# Patient Record
Sex: FEMALE | Race: WHITE | NOT HISPANIC OR LATINO | Employment: FULL TIME | URBAN - METROPOLITAN AREA
[De-identification: names, ages, dates, MRNs, and addresses within clinical notes are randomized per-mention and may not be internally consistent; named-entity substitution may affect disease eponyms.]

---

## 2018-07-27 ENCOUNTER — HOSPITAL ENCOUNTER (OUTPATIENT)
Facility: MEDICAL CENTER | Age: 48
End: 2018-07-29
Attending: EMERGENCY MEDICINE | Admitting: FAMILY MEDICINE

## 2018-07-27 ENCOUNTER — APPOINTMENT (OUTPATIENT)
Dept: RADIOLOGY | Facility: MEDICAL CENTER | Age: 48
End: 2018-07-27
Attending: EMERGENCY MEDICINE

## 2018-07-27 DIAGNOSIS — K04.7 APICAL ABSCESS: ICD-10-CM

## 2018-07-27 DIAGNOSIS — K08.89 PAIN, DENTAL: ICD-10-CM

## 2018-07-27 LAB
ALBUMIN SERPL BCP-MCNC: 3.7 G/DL (ref 3.2–4.9)
ALBUMIN/GLOB SERPL: 1 G/DL
ALP SERPL-CCNC: 60 U/L (ref 30–99)
ALT SERPL-CCNC: 46 U/L (ref 2–50)
ANION GAP SERPL CALC-SCNC: 7 MMOL/L (ref 0–11.9)
APPEARANCE UR: CLEAR
AST SERPL-CCNC: 25 U/L (ref 12–45)
BASOPHILS # BLD AUTO: 0.5 % (ref 0–1.8)
BASOPHILS # BLD: 0.05 K/UL (ref 0–0.12)
BILIRUB SERPL-MCNC: 0.7 MG/DL (ref 0.1–1.5)
BILIRUB UR QL STRIP.AUTO: NEGATIVE
BUN SERPL-MCNC: 9 MG/DL (ref 8–22)
CALCIUM SERPL-MCNC: 9.1 MG/DL (ref 8.4–10.2)
CHLORIDE SERPL-SCNC: 104 MMOL/L (ref 96–112)
CO2 SERPL-SCNC: 20 MMOL/L (ref 20–33)
COLOR UR: YELLOW
CREAT SERPL-MCNC: 0.8 MG/DL (ref 0.5–1.4)
EOSINOPHIL # BLD AUTO: 0.15 K/UL (ref 0–0.51)
EOSINOPHIL NFR BLD: 1.5 % (ref 0–6.9)
ERYTHROCYTE [DISTWIDTH] IN BLOOD BY AUTOMATED COUNT: 41.6 FL (ref 35.9–50)
GLOBULIN SER CALC-MCNC: 3.7 G/DL (ref 1.9–3.5)
GLUCOSE SERPL-MCNC: 120 MG/DL (ref 65–99)
GLUCOSE UR STRIP.AUTO-MCNC: NEGATIVE MG/DL
HCT VFR BLD AUTO: 39.7 % (ref 37–47)
HGB BLD-MCNC: 13 G/DL (ref 12–16)
IMM GRANULOCYTES # BLD AUTO: 0.04 K/UL (ref 0–0.11)
IMM GRANULOCYTES NFR BLD AUTO: 0.4 % (ref 0–0.9)
KETONES UR STRIP.AUTO-MCNC: NEGATIVE MG/DL
LACTATE BLD-SCNC: 0.9 MMOL/L (ref 0.5–2)
LEUKOCYTE ESTERASE UR QL STRIP.AUTO: NEGATIVE
LYMPHOCYTES # BLD AUTO: 1.73 K/UL (ref 1–4.8)
LYMPHOCYTES NFR BLD: 17.4 % (ref 22–41)
MCH RBC QN AUTO: 27.5 PG (ref 27–33)
MCHC RBC AUTO-ENTMCNC: 32.7 G/DL (ref 33.6–35)
MCV RBC AUTO: 84.1 FL (ref 81.4–97.8)
MICRO URNS: NORMAL
MONOCYTES # BLD AUTO: 0.94 K/UL (ref 0–0.85)
MONOCYTES NFR BLD AUTO: 9.5 % (ref 0–13.4)
NEUTROPHILS # BLD AUTO: 7.01 K/UL (ref 2–7.15)
NEUTROPHILS NFR BLD: 70.7 % (ref 44–72)
NITRITE UR QL STRIP.AUTO: NEGATIVE
NRBC # BLD AUTO: 0 K/UL
NRBC BLD-RTO: 0 /100 WBC
PH UR STRIP.AUTO: 6.5 [PH]
PLATELET # BLD AUTO: 222 K/UL (ref 164–446)
PMV BLD AUTO: 9 FL (ref 9–12.9)
POTASSIUM SERPL-SCNC: 3.8 MMOL/L (ref 3.6–5.5)
PROT SERPL-MCNC: 7.4 G/DL (ref 6–8.2)
PROT UR QL STRIP: NEGATIVE MG/DL
RBC # BLD AUTO: 4.72 M/UL (ref 4.2–5.4)
RBC UR QL AUTO: NEGATIVE
SODIUM SERPL-SCNC: 131 MMOL/L (ref 135–145)
SP GR UR STRIP.AUTO: <=1.005
WBC # BLD AUTO: 9.9 K/UL (ref 4.8–10.8)

## 2018-07-27 PROCEDURE — A9270 NON-COVERED ITEM OR SERVICE: HCPCS

## 2018-07-27 PROCEDURE — 83605 ASSAY OF LACTIC ACID: CPT

## 2018-07-27 PROCEDURE — 87040 BLOOD CULTURE FOR BACTERIA: CPT

## 2018-07-27 PROCEDURE — 87086 URINE CULTURE/COLONY COUNT: CPT

## 2018-07-27 PROCEDURE — 96365 THER/PROPH/DIAG IV INF INIT: CPT

## 2018-07-27 PROCEDURE — 700111 HCHG RX REV CODE 636 W/ 250 OVERRIDE (IP): Performed by: EMERGENCY MEDICINE

## 2018-07-27 PROCEDURE — 99285 EMERGENCY DEPT VISIT HI MDM: CPT

## 2018-07-27 PROCEDURE — 80053 COMPREHEN METABOLIC PANEL: CPT

## 2018-07-27 PROCEDURE — 700102 HCHG RX REV CODE 250 W/ 637 OVERRIDE(OP)

## 2018-07-27 PROCEDURE — 700117 HCHG RX CONTRAST REV CODE 255: Performed by: EMERGENCY MEDICINE

## 2018-07-27 PROCEDURE — 81003 URINALYSIS AUTO W/O SCOPE: CPT

## 2018-07-27 PROCEDURE — 36415 COLL VENOUS BLD VENIPUNCTURE: CPT

## 2018-07-27 PROCEDURE — 85025 COMPLETE CBC W/AUTO DIFF WBC: CPT

## 2018-07-27 PROCEDURE — 96375 TX/PRO/DX INJ NEW DRUG ADDON: CPT

## 2018-07-27 PROCEDURE — 700105 HCHG RX REV CODE 258: Performed by: EMERGENCY MEDICINE

## 2018-07-27 PROCEDURE — 71045 X-RAY EXAM CHEST 1 VIEW: CPT

## 2018-07-27 PROCEDURE — 70487 CT MAXILLOFACIAL W/DYE: CPT

## 2018-07-27 RX ORDER — MORPHINE SULFATE 4 MG/ML
4 INJECTION, SOLUTION INTRAMUSCULAR; INTRAVENOUS ONCE
Status: COMPLETED | OUTPATIENT
Start: 2018-07-28 | End: 2018-07-28

## 2018-07-27 RX ORDER — ACETAMINOPHEN 325 MG/1
650 TABLET ORAL ONCE
Status: COMPLETED | OUTPATIENT
Start: 2018-07-27 | End: 2018-07-27

## 2018-07-27 RX ORDER — KETOROLAC TROMETHAMINE 30 MG/ML
30 INJECTION, SOLUTION INTRAMUSCULAR; INTRAVENOUS ONCE
Status: COMPLETED | OUTPATIENT
Start: 2018-07-27 | End: 2018-07-27

## 2018-07-27 RX ADMIN — ACETAMINOPHEN 650 MG: 325 TABLET, FILM COATED ORAL at 21:28

## 2018-07-27 RX ADMIN — KETOROLAC TROMETHAMINE 30 MG: 30 INJECTION, SOLUTION INTRAMUSCULAR at 22:21

## 2018-07-27 RX ADMIN — SODIUM CHLORIDE 3 G: 900 INJECTION INTRAVENOUS at 22:21

## 2018-07-27 RX ADMIN — IOHEXOL 80 ML: 350 INJECTION, SOLUTION INTRAVENOUS at 22:40

## 2018-07-27 ASSESSMENT — ENCOUNTER SYMPTOMS
NAUSEA: 0
SORE THROAT: 1
ABDOMINAL PAIN: 0
DIZZINESS: 0
FEVER: 1
VOMITING: 0

## 2018-07-27 ASSESSMENT — PAIN SCALES - GENERAL: PAINLEVEL_OUTOF10: 0

## 2018-07-28 ENCOUNTER — APPOINTMENT (OUTPATIENT)
Dept: RADIOLOGY | Facility: MEDICAL CENTER | Age: 48
End: 2018-07-28
Attending: EMERGENCY MEDICINE

## 2018-07-28 PROBLEM — L03.211 FACIAL CELLULITIS: Status: ACTIVE | Noted: 2018-07-28

## 2018-07-28 PROBLEM — K04.7 APICAL ABSCESS: Status: ACTIVE | Noted: 2018-07-28

## 2018-07-28 PROBLEM — K08.89 PAIN, DENTAL: Status: ACTIVE | Noted: 2018-07-28

## 2018-07-28 PROBLEM — E87.1 HYPONATREMIA: Status: ACTIVE | Noted: 2018-07-28

## 2018-07-28 PROCEDURE — 160035 HCHG PACU - 1ST 60 MINS PHASE I: Performed by: ORAL & MAXILLOFACIAL SURGERY

## 2018-07-28 PROCEDURE — 70355 PANORAMIC X-RAY OF JAWS: CPT

## 2018-07-28 PROCEDURE — 700111 HCHG RX REV CODE 636 W/ 250 OVERRIDE (IP)

## 2018-07-28 PROCEDURE — G0378 HOSPITAL OBSERVATION PER HR: HCPCS

## 2018-07-28 PROCEDURE — 96376 TX/PRO/DX INJ SAME DRUG ADON: CPT

## 2018-07-28 PROCEDURE — 700102 HCHG RX REV CODE 250 W/ 637 OVERRIDE(OP)

## 2018-07-28 PROCEDURE — 500380 HCHG DRAIN, PENROSE 1/4X12: Performed by: ORAL & MAXILLOFACIAL SURGERY

## 2018-07-28 PROCEDURE — 94760 N-INVAS EAR/PLS OXIMETRY 1: CPT

## 2018-07-28 PROCEDURE — A9270 NON-COVERED ITEM OR SERVICE: HCPCS | Performed by: FAMILY MEDICINE

## 2018-07-28 PROCEDURE — 160048 HCHG OR STATISTICAL LEVEL 1-5: Performed by: ORAL & MAXILLOFACIAL SURGERY

## 2018-07-28 PROCEDURE — 160002 HCHG RECOVERY MINUTES (STAT): Performed by: ORAL & MAXILLOFACIAL SURGERY

## 2018-07-28 PROCEDURE — 160028 HCHG SURGERY MINUTES - 1ST 30 MINS LEVEL 3: Performed by: ORAL & MAXILLOFACIAL SURGERY

## 2018-07-28 PROCEDURE — 96375 TX/PRO/DX INJ NEW DRUG ADDON: CPT

## 2018-07-28 PROCEDURE — 700105 HCHG RX REV CODE 258: Performed by: FAMILY MEDICINE

## 2018-07-28 PROCEDURE — 700111 HCHG RX REV CODE 636 W/ 250 OVERRIDE (IP): Performed by: EMERGENCY MEDICINE

## 2018-07-28 PROCEDURE — 700105 HCHG RX REV CODE 258

## 2018-07-28 PROCEDURE — 700105 HCHG RX REV CODE 258: Performed by: HOSPITALIST

## 2018-07-28 PROCEDURE — 160009 HCHG ANES TIME/MIN: Performed by: ORAL & MAXILLOFACIAL SURGERY

## 2018-07-28 PROCEDURE — 700111 HCHG RX REV CODE 636 W/ 250 OVERRIDE (IP): Performed by: ORAL & MAXILLOFACIAL SURGERY

## 2018-07-28 PROCEDURE — 700101 HCHG RX REV CODE 250

## 2018-07-28 PROCEDURE — 700111 HCHG RX REV CODE 636 W/ 250 OVERRIDE (IP): Performed by: FAMILY MEDICINE

## 2018-07-28 PROCEDURE — 160039 HCHG SURGERY MINUTES - EA ADDL 1 MIN LEVEL 3: Performed by: ORAL & MAXILLOFACIAL SURGERY

## 2018-07-28 PROCEDURE — 501838 HCHG SUTURE GENERAL: Performed by: ORAL & MAXILLOFACIAL SURGERY

## 2018-07-28 PROCEDURE — 700102 HCHG RX REV CODE 250 W/ 637 OVERRIDE(OP): Performed by: FAMILY MEDICINE

## 2018-07-28 PROCEDURE — 99219 PR INITIAL OBSERVATION CARE,LEVL II: CPT | Performed by: FAMILY MEDICINE

## 2018-07-28 PROCEDURE — A9270 NON-COVERED ITEM OR SERVICE: HCPCS

## 2018-07-28 PROCEDURE — 700111 HCHG RX REV CODE 636 W/ 250 OVERRIDE (IP): Performed by: HOSPITALIST

## 2018-07-28 RX ORDER — LIDOCAINE HYDROCHLORIDE AND EPINEPHRINE 10; 10 MG/ML; UG/ML
INJECTION, SOLUTION INFILTRATION; PERINEURAL
Status: DISCONTINUED | OUTPATIENT
Start: 2018-07-28 | End: 2018-07-28 | Stop reason: HOSPADM

## 2018-07-28 RX ORDER — DEXTROSE AND SODIUM CHLORIDE 5; .9 G/100ML; G/100ML
INJECTION, SOLUTION INTRAVENOUS CONTINUOUS
Status: DISCONTINUED | OUTPATIENT
Start: 2018-07-28 | End: 2018-07-28

## 2018-07-28 RX ORDER — LABETALOL HYDROCHLORIDE 5 MG/ML
10 INJECTION, SOLUTION INTRAVENOUS EVERY 4 HOURS PRN
Status: DISCONTINUED | OUTPATIENT
Start: 2018-07-28 | End: 2018-07-29 | Stop reason: HOSPADM

## 2018-07-28 RX ORDER — SODIUM CHLORIDE 9 MG/ML
INJECTION, SOLUTION INTRAVENOUS CONTINUOUS
Status: DISCONTINUED | OUTPATIENT
Start: 2018-07-28 | End: 2018-07-29 | Stop reason: HOSPADM

## 2018-07-28 RX ORDER — KETOROLAC TROMETHAMINE 30 MG/ML
30 INJECTION, SOLUTION INTRAMUSCULAR; INTRAVENOUS EVERY 6 HOURS
Status: DISCONTINUED | OUTPATIENT
Start: 2018-07-28 | End: 2018-07-29 | Stop reason: HOSPADM

## 2018-07-28 RX ORDER — ONDANSETRON 2 MG/ML
4 INJECTION INTRAMUSCULAR; INTRAVENOUS EVERY 4 HOURS PRN
Status: DISCONTINUED | OUTPATIENT
Start: 2018-07-28 | End: 2018-07-29 | Stop reason: HOSPADM

## 2018-07-28 RX ORDER — HALOPERIDOL 5 MG/ML
1 INJECTION INTRAMUSCULAR EVERY 6 HOURS PRN
Status: DISCONTINUED | OUTPATIENT
Start: 2018-07-28 | End: 2018-07-29 | Stop reason: HOSPADM

## 2018-07-28 RX ORDER — KETOROLAC TROMETHAMINE 30 MG/ML
15 INJECTION, SOLUTION INTRAMUSCULAR; INTRAVENOUS EVERY 6 HOURS PRN
Status: DISCONTINUED | OUTPATIENT
Start: 2018-07-28 | End: 2018-07-28

## 2018-07-28 RX ORDER — OXYCODONE HCL 5 MG/5 ML
SOLUTION, ORAL ORAL
Status: COMPLETED
Start: 2018-07-28 | End: 2018-07-28

## 2018-07-28 RX ORDER — MORPHINE SULFATE 4 MG/ML
4 INJECTION, SOLUTION INTRAMUSCULAR; INTRAVENOUS EVERY 4 HOURS PRN
Status: DISCONTINUED | OUTPATIENT
Start: 2018-07-28 | End: 2018-07-29 | Stop reason: HOSPADM

## 2018-07-28 RX ORDER — KETOROLAC TROMETHAMINE 30 MG/ML
INJECTION, SOLUTION INTRAMUSCULAR; INTRAVENOUS
Status: COMPLETED
Start: 2018-07-28 | End: 2018-07-28

## 2018-07-28 RX ORDER — ACETAMINOPHEN 325 MG/1
650 TABLET ORAL EVERY 6 HOURS PRN
Status: DISCONTINUED | OUTPATIENT
Start: 2018-07-28 | End: 2018-07-29 | Stop reason: HOSPADM

## 2018-07-28 RX ADMIN — MORPHINE SULFATE 4 MG: 4 INJECTION INTRAVENOUS at 23:37

## 2018-07-28 RX ADMIN — ACETAMINOPHEN 650 MG: 325 TABLET, FILM COATED ORAL at 20:14

## 2018-07-28 RX ADMIN — MORPHINE SULFATE 4 MG: 4 INJECTION INTRAVENOUS at 00:05

## 2018-07-28 RX ADMIN — ONDANSETRON HYDROCHLORIDE 4 MG: 2 INJECTION, SOLUTION INTRAMUSCULAR; INTRAVENOUS at 23:40

## 2018-07-28 RX ADMIN — AMPICILLIN SODIUM AND SULBACTAM SODIUM 3 G: 2; 1 INJECTION, POWDER, FOR SOLUTION INTRAMUSCULAR; INTRAVENOUS at 08:03

## 2018-07-28 RX ADMIN — SODIUM CHLORIDE: 9 INJECTION, SOLUTION INTRAVENOUS at 13:25

## 2018-07-28 RX ADMIN — OXYCODONE HYDROCHLORIDE 10 MG: 5 SOLUTION ORAL at 17:50

## 2018-07-28 RX ADMIN — FENTANYL CITRATE 25 MCG: 50 INJECTION, SOLUTION INTRAMUSCULAR; INTRAVENOUS at 17:55

## 2018-07-28 RX ADMIN — FENTANYL CITRATE 25 MCG: 50 INJECTION, SOLUTION INTRAMUSCULAR; INTRAVENOUS at 17:50

## 2018-07-28 RX ADMIN — KETOROLAC TROMETHAMINE 30 MG: 30 INJECTION, SOLUTION INTRAMUSCULAR at 18:26

## 2018-07-28 RX ADMIN — AMPICILLIN SODIUM AND SULBACTAM SODIUM 3 G: 2; 1 INJECTION, POWDER, FOR SOLUTION INTRAMUSCULAR; INTRAVENOUS at 13:25

## 2018-07-28 RX ADMIN — MORPHINE SULFATE 4 MG: 4 INJECTION INTRAVENOUS at 13:20

## 2018-07-28 RX ADMIN — SODIUM CHLORIDE: 900 INJECTION INTRAVENOUS at 03:04

## 2018-07-28 RX ADMIN — KETOROLAC TROMETHAMINE 30 MG: 30 INJECTION, SOLUTION INTRAMUSCULAR at 23:37

## 2018-07-28 RX ADMIN — AMPICILLIN SODIUM AND SULBACTAM SODIUM 3 G: 2; 1 INJECTION, POWDER, FOR SOLUTION INTRAMUSCULAR; INTRAVENOUS at 20:15

## 2018-07-28 RX ADMIN — KETOROLAC TROMETHAMINE 15 MG: 30 INJECTION, SOLUTION INTRAMUSCULAR at 08:02

## 2018-07-28 RX ADMIN — KETOROLAC TROMETHAMINE 15 MG: 30 INJECTION, SOLUTION INTRAMUSCULAR at 02:03

## 2018-07-28 RX ADMIN — ACETAMINOPHEN 650 MG: 325 TABLET, FILM COATED ORAL at 10:05

## 2018-07-28 RX ADMIN — DEXTROSE AND SODIUM CHLORIDE: 5; 900 INJECTION, SOLUTION INTRAVENOUS at 02:53

## 2018-07-28 RX ADMIN — ACETAMINOPHEN 650 MG: 325 TABLET, FILM COATED ORAL at 03:04

## 2018-07-28 ASSESSMENT — PAIN SCALES - GENERAL
PAINLEVEL_OUTOF10: 8
PAINLEVEL_OUTOF10: 6
PAINLEVEL_OUTOF10: 7
PAINLEVEL_OUTOF10: 6
PAINLEVEL_OUTOF10: 3
PAINLEVEL_OUTOF10: 8

## 2018-07-28 ASSESSMENT — LIFESTYLE VARIABLES
ALCOHOL_USE: NO
EVER_SMOKED: YES
EVER_SMOKED: YES

## 2018-07-28 ASSESSMENT — PATIENT HEALTH QUESTIONNAIRE - PHQ9
2. FEELING DOWN, DEPRESSED, IRRITABLE, OR HOPELESS: NOT AT ALL
1. LITTLE INTEREST OR PLEASURE IN DOING THINGS: NOT AT ALL
SUM OF ALL RESPONSES TO PHQ9 QUESTIONS 1 AND 2: 0

## 2018-07-28 ASSESSMENT — ENCOUNTER SYMPTOMS
GASTROINTESTINAL NEGATIVE: 1
FEVER: 1
SINUS PAIN: 1
EYES NEGATIVE: 1
CARDIOVASCULAR NEGATIVE: 1
NEUROLOGICAL NEGATIVE: 1
RESPIRATORY NEGATIVE: 1
MUSCULOSKELETAL NEGATIVE: 1
PSYCHIATRIC NEGATIVE: 1

## 2018-07-28 ASSESSMENT — COPD QUESTIONNAIRES
COPD SCREENING SCORE: 3
DURING THE PAST 4 WEEKS HOW MUCH DID YOU FEEL SHORT OF BREATH: NONE/LITTLE OF THE TIME
HAVE YOU SMOKED AT LEAST 100 CIGARETTES IN YOUR ENTIRE LIFE: YES
DO YOU EVER COUGH UP ANY MUCUS OR PHLEGM?: NO/ONLY WITH OCCASIONAL COLDS OR INFECTIONS

## 2018-07-28 NOTE — ED NOTES
Pt has been medicated for fever, as prescribed per protocol and adhering to strict ED standards.

## 2018-07-28 NOTE — PROGRESS NOTES
Pt seen and examined by myself, admitted early am, see H and P for full details.  Getting oral surgery later today by Dr. Day, on IV unasyn. Added morphine IV for pain.

## 2018-07-28 NOTE — ED PROVIDER NOTES
ED Provider Note    CHIEF COMPLAINT  Chief Complaint   Patient presents with   • Facial Swelling       HPI  Donita Mcguire is a 48 y.o. female who presents with 4 days of dental pain. Patient reports that for the last 2 days due to the pain is worsened and has been difficult to open her mouth. She reports associated fever for one day. She denies any difficulty swallowing or breathing. She denies any changes in voice. She denies any cough dysuria sore throat or abdominal pain.    REVIEW OF SYSTEMS  Review of Systems   Constitutional: Positive for fever.   HENT: Positive for sore throat.    Cardiovascular: Negative for chest pain.   Gastrointestinal: Negative for abdominal pain, nausea and vomiting.   Genitourinary: Negative for dysuria and urgency.   Neurological: Negative for dizziness.       See HPI for further details. All other systems are negative.     PAST MEDICAL HISTORY   has a past medical history of ASTHMA.    SOCIAL HISTORY  Social History     Social History Main Topics   • Smoking status: Current Every Day Smoker     Packs/day: 0.50   • Smokeless tobacco: Never Used   • Alcohol use Yes      Comment: Occasionally   • Drug use: No   • Sexual activity: Not on file       SURGICAL HISTORY   has a past surgical history that includes other orthopedic surgery (2006) and shreyas by laparoscopy (8/12/08).    CURRENT MEDICATIONS  Home Medications     Reviewed by Oliver Gonzalez R.N. (Registered Nurse) on 07/27/18 at 3672  Med List Status: Partial   Medication Last Dose Status   ALBUTEROL INH PRN Active   PERCOCET 5-325 MG PO TABS Not taking Active                ALLERGIES  Allergies   Allergen Reactions   • Nkda [No Known Drug Allergy]        PHYSICAL EXAM  Physical Exam   Constitutional: She appears well-developed and well-nourished.   HENT:   Head: Normocephalic and atraumatic.   2 finger trismus, significant facial swelling on left face with associated underlying induration. There is pain to percussion of the  maxillary molar without any major focal fluctuance surrounding this area. Posterior pharynx is unremarkable. Submandibular space unremarkable. Normal voice, no stridor   Cardiovascular: Regular rhythm.    Mildly tachycardic   Pulmonary/Chest: Effort normal and breath sounds normal.   Abdominal: Soft. Bowel sounds are normal. She exhibits no distension. There is no tenderness.         DIAGNOSTIC STUDIES / PROCEDURES        LABS  Results for orders placed or performed during the hospital encounter of 07/27/18   Lactic acid (lactate)   Result Value Ref Range    Lactic Acid 0.9 0.5 - 2.0 mmol/L   CBC WITH DIFFERENTIAL   Result Value Ref Range    WBC 9.9 4.8 - 10.8 K/uL    RBC 4.72 4.20 - 5.40 M/uL    Hemoglobin 13.0 12.0 - 16.0 g/dL    Hematocrit 39.7 37.0 - 47.0 %    MCV 84.1 81.4 - 97.8 fL    MCH 27.5 27.0 - 33.0 pg    MCHC 32.7 (L) 33.6 - 35.0 g/dL    RDW 41.6 35.9 - 50.0 fL    Platelet Count 222 164 - 446 K/uL    MPV 9.0 9.0 - 12.9 fL    Neutrophils-Polys 70.70 44.00 - 72.00 %    Lymphocytes 17.40 (L) 22.00 - 41.00 %    Monocytes 9.50 0.00 - 13.40 %    Eosinophils 1.50 0.00 - 6.90 %    Basophils 0.50 0.00 - 1.80 %    Immature Granulocytes 0.40 0.00 - 0.90 %    Nucleated RBC 0.00 /100 WBC    Neutrophils (Absolute) 7.01 2.00 - 7.15 K/uL    Lymphs (Absolute) 1.73 1.00 - 4.80 K/uL    Monos (Absolute) 0.94 (H) 0.00 - 0.85 K/uL    Eos (Absolute) 0.15 0.00 - 0.51 K/uL    Baso (Absolute) 0.05 0.00 - 0.12 K/uL    Immature Granulocytes (abs) 0.04 0.00 - 0.11 K/uL    NRBC (Absolute) 0.00 K/uL   COMP METABOLIC PANEL   Result Value Ref Range    Sodium 131 (L) 135 - 145 mmol/L    Potassium 3.8 3.6 - 5.5 mmol/L    Chloride 104 96 - 112 mmol/L    Co2 20 20 - 33 mmol/L    Anion Gap 7.0 0.0 - 11.9    Glucose 120 (H) 65 - 99 mg/dL    Bun 9 8 - 22 mg/dL    Creatinine 0.80 0.50 - 1.40 mg/dL    Calcium 9.1 8.4 - 10.2 mg/dL    AST(SGOT) 25 12 - 45 U/L    ALT(SGPT) 46 2 - 50 U/L    Alkaline Phosphatase 60 30 - 99 U/L    Total Bilirubin 0.7  0.1 - 1.5 mg/dL    Albumin 3.7 3.2 - 4.9 g/dL    Total Protein 7.4 6.0 - 8.2 g/dL    Globulin 3.7 (H) 1.9 - 3.5 g/dL    A-G Ratio 1.0 g/dL   URINALYSIS   Result Value Ref Range    Color Yellow     Character Clear     Specific Gravity <=1.005 <1.035    Ph 6.5 5.0 - 8.0    Glucose Negative Negative mg/dL    Ketones Negative Negative mg/dL    Protein Negative Negative mg/dL    Bilirubin Negative Negative    Nitrite Negative Negative    Leukocyte Esterase Negative Negative    Occult Blood Negative Negative    Micro Urine Req see below    ESTIMATED GFR   Result Value Ref Range    GFR If African American >60 >60 mL/min/1.73 m 2    GFR If Non African American >60 >60 mL/min/1.73 m 2         RADIOLOGY  [id number]   Final Result         1.  No acute traumatic bony injury identified.   2.  Opacification of the left maxillary sinus.      DX-CHEST-PORTABLE (1 VIEW)   Final Result         1.  No acute cardiopulmonary disease.      CT-MAXILLOFACIAL WITH PLUS RECONS    (Results Pending)           COURSE & MEDICAL DECISION MAKING  Pertinent Labs & Imaging studies reviewed. (See chart for details)  Patient with likely dental abscess with associated trismus and facial swelling. I'm unable to appreciate any major buccal abscess at this point and therefore we'll scan for possible alternative causative etiology. Giving IV antibiotics and analgesics.  Fluid bolus was deferred given patient with a normal lactate  CT scan reveals a likely apical abscess that has eroded the patient's maxillary sinus causing an associated sinusitis. Patient received antibiotics for this. She has received pain medication for this. I discussed the case with oral surgery as I believe the tooth will need to be removed as a sinusitis will likely not resolve until the focus of infection has been removed. Oral surgery agrees and they would like patient admitted for surgery and IV antibiotics. I discussed the case with hospitalist who has accepted case  and will assume care. Oral surgery as asked that Panoramic maxillary x-rays be done, I have ordered this and these redemonstrate finding seen on CT.      FINAL IMPRESSION  1. Apical abscess, sinusitis, sepsis         Electronically signed by: Orlando Maya, 7/27/2018 10:06 PM

## 2018-07-28 NOTE — ASSESSMENT & PLAN NOTE
CT shows erosion of left maxillary molar tooth root into the sinus causing left maxillary sinusitis. Oral surgery has been consulted and patient will have surgery tomorrow to remove tooth. Started on Unasyn. Keep NPO

## 2018-07-28 NOTE — ED NOTES
Report received from Lawrence MERCHANT.  Assumed patient care. Pt assesement done.  Plan of care reviewed with patient.

## 2018-07-28 NOTE — DIETARY
"Nutrition services: Day 0 of admit.  Donita Mcguire is a 48 y.o. female with admitting DX of Apical Abcess, Dental pain, and Sinusitis.      Consult received for poor PO intake PTA and BMI of 45.68.     Pt with PMH of Asthma. Per chart review, pt presented with fever and left sided facial pain/swelling. Pt noted pain in a tooth she had previously cracked, most likely the cause of her poor PO intake PTA. Noted to have not followed up with a dentist. Per MD assessment and plan, oral surgery has been consulted and pt to have surgery. Current diet order NPO related to future surgery. RD to follow clinical course awaiting diet advancement and sufficient PO intake.     Assessment:  Height: 170.2 cm (5' 7\")  Weight: (!) 132.3 kg (291 lb 10.7 oz)  Body mass index is 45.68 kg/m².  Diet/Intake: NPO for possible surgery    Labs (Na 131) , MAR (D5 NS infusion @ 125 mL/hr providing 510 deangelo of dextrose/day) and Chart Reviewed.       Recommendations/Plan:  Weight loss counseling not appropriate in acute care setting and recommend referral to outpatient nutrition services for weight management after D/C.     1. Nutrition per MD discretion. RD awaiting diet advancement when medically appropriate.   2. Encourage intake of 50% or greater.   3. Document intake of all meals  as % taken in ADL's to provide interdisciplinary communication across all shifts.   4. Monitor weight.  5. Nutrition rep will continue to see patient for ongoing meal and snack preferences.           "

## 2018-07-28 NOTE — PROGRESS NOTES
Bedside report completed. Assumed pt care. Pt A&O 4, resting in bed comfortably with no signs of labored breathing on RA. Pt is medical. Pt call light within reach, bed in low position, upper bed rails up, non skid socks in place. Pt complaining of facial pain, will medicate per MAR PRN. Patient was updated on the plan of care for the day, pt has been NPO all night shift for possible surgery today. All fall precautions in place. Will continue to monitor.

## 2018-07-28 NOTE — ED NOTES
Pt presents with complaints of fever and a worsening tooth abscess since yesterday. She meets the R/O sepsis protocol and will be roomed immediately.

## 2018-07-28 NOTE — CONSULTS
Oral Surgery Consult    Requesting Physician:  Dr. Maya, [facility] ED    Reason for Consultation:  Worsening eft upper jaw pain and swelling for 4 days.     ID/HPI:  The patient is a 48 y.o. female with asthma who presented to the [facility] ED last night complaining of left upper jaw pain and swelling extending to the temple with pain around and behind the left eye and reported fever.  She has not seen a dentist for a few years.     Medical History:  Mild asthma    Surgical History:  Lap cholecystectomy, bilateral knee ACL reconstruction    Medications:  Albuterol, percocet prn.    Allergies:  NKDA    Social History:  Pt lives in locally.  She quit smoking in 2010, drinks Etoh occasional, and denies illicit drug use.      ROS:  Negative for acute CV, Pulm, GI, , Neuro, or Endocrine disease.   Denies headache, dizziness, CP, SOB.  Recent difficulty swallowing, fever, and chills.      Examination:  T 98.7 F, /61, Pulse 89, Resp 20, SpO2 93-97% on RA  General:  Well developed, well nourished, overweight, age appropriate female in no distress.  HEENT:    Head atraumatic, normocephalic.    Eyes:  PERRL, EOMI, normal vision.    Ears:  Hearing grossly intact to finger rub bilaterally.    Nose:  Nares patent bilaterally.    Oropharynx:  Buccal tenderness in the left maxillary region extending to the temple. The oropharynx is clear without shift of midline structures.  The floor of mouth is soft without tongue elevation.  Mild trismus. The tongue is not elevated and the floor of mouth is soft.  Decayed teeth in the left lower molar and premolar region.  No paresthesia.    Submandibular:  Soft and nontender.   Chest:  Symmetric chest rise.  No tenderness.  No wheezing  CV:  RRR without murmur.  Lungs:  Breathing clear and unlabored.    Abdomen:  Full, soft, nontender, nondistended.   Extremities:  Warm, without edema, peripheral pulses present.  Neurologic:  CN II-XII  grossly intact bilaterally.  Normal mentation and conversation.  Nonfocal exam.      Labs:    1. CBC with WBC 9.9, Hct 40, Plt 222, Neuts 71%  2. CMP with glucose 120    Imagin.  Maxillofacial CT - shows opacification of the left maxillary sinus and decayed tooth number 15 with periapical lucencies with swelling lateral to the left maxilla with a small subperiosteal lucency consistent with an inflammatory fluid collection.   2.  Panoramic Mandible - Left maxillary hazy opacification.  Teeth not well visualized.  Most teeth appear intact.     Assessment/Plan:  This 48 year old woman with chronically infected tooth number has developed left facial cellulitis and left maxillary sinusitis with pain.  She is admitted to the Hospitalist service for IV antibiotics and will require surgical treatment to include extraction of tooth number 15, incision and drainage of the presumed buccal space abscess, and evacuation of the left maxillary sinus through a Vasquez-james osteotomy.  The patient is amenable to this treatment recommendation and wishes to proceed as discussed.      Consent discussion:  The procedure, risks, benefits, and alternatives have been described and discussed in detail.  Risks include, but are not limited to, persistent infection, bleeding, need for reoperation, postoperative antibiotics, and nerve injury resulting in possible weakness of facial musculature and altered sensation of the facial and oral tissues.  All questions were answered and the operating room has been scheduled.  NPO status is appropriate for general anesthesia.      Postoperative Disposition:  Admission to the floor by the [facility]ist for observation, pain control, and IV antibiotics.  I will follow closely during the hospital course and advise on drain removal, discharge instructions, and antibiotics.      Thanks to all those involved with the care, admission and discharge of this patient.      Peter Day DDS, MD

## 2018-07-28 NOTE — CARE PLAN
Problem: Safety  Goal: Will remain free from injury    Intervention: Provide assistance with mobility  Pt mobility assessed at beginning of shift, pt is up self and steady.  Fall precautions in place, non-slip socks on, bed in lowest, locked position and call light is within reach.  Pt educated to call for assistance and verbalizes understanding.       Problem: Infection  Goal: Will remain free from infection    Intervention: Assess signs and symptoms of infection  Pt monitored for signs and symptoms of infection. Vitals and labs assessed and monitored for signs of infection. Proper hand hygiene performed prior to entering and exiting pt's room. IV abx being administered. Pt educated on proper hand hygiene.      Problem: Pain Management  Goal: Pain level will decrease to patient's comfort goal    Intervention: Follow pain managment plan developed in collaboration with patient and Interdisciplinary Team  Medicated per MAR, educated on pain scale, implemented non-pharmacological methods: distraction, one-on-one discussion, repositioned and rest.

## 2018-07-28 NOTE — PROGRESS NOTES
Pt arrived from ER dept. Via [facility], accompanied by transport. Pt steady on her gait during transfer from [facility] to bed.Pt made aware of current tx plan- IVF,IV anti-biotic and pain mgm't.Call light  use encouraged. Will continue to monitor.

## 2018-07-28 NOTE — OR NURSING
1445- Pt brought to PACU and prepared for procedure.  Left side of mouth and cheek swollen.    1500- IV fluids connected, IV patents.  NPO status verified, ring placed in chart.    1515- C/O increase pain to left side of mouth.    1520- Dr. Walden in with Pt.

## 2018-07-29 VITALS
BODY MASS INDEX: 45.78 KG/M2 | HEIGHT: 67 IN | SYSTOLIC BLOOD PRESSURE: 125 MMHG | OXYGEN SATURATION: 94 % | TEMPERATURE: 97.5 F | DIASTOLIC BLOOD PRESSURE: 41 MMHG | HEART RATE: 82 BPM | WEIGHT: 291.67 LBS | RESPIRATION RATE: 18 BRPM

## 2018-07-29 PROBLEM — L03.211 FACIAL CELLULITIS: Status: RESOLVED | Noted: 2018-07-28 | Resolved: 2018-07-29

## 2018-07-29 PROBLEM — K04.7 APICAL ABSCESS: Status: RESOLVED | Noted: 2018-07-28 | Resolved: 2018-07-29

## 2018-07-29 PROCEDURE — 700105 HCHG RX REV CODE 258: Performed by: FAMILY MEDICINE

## 2018-07-29 PROCEDURE — 700102 HCHG RX REV CODE 250 W/ 637 OVERRIDE(OP): Performed by: ORAL & MAXILLOFACIAL SURGERY

## 2018-07-29 PROCEDURE — 700111 HCHG RX REV CODE 636 W/ 250 OVERRIDE (IP): Performed by: FAMILY MEDICINE

## 2018-07-29 PROCEDURE — A9270 NON-COVERED ITEM OR SERVICE: HCPCS | Performed by: FAMILY MEDICINE

## 2018-07-29 PROCEDURE — 700111 HCHG RX REV CODE 636 W/ 250 OVERRIDE (IP): Performed by: ORAL & MAXILLOFACIAL SURGERY

## 2018-07-29 PROCEDURE — 700111 HCHG RX REV CODE 636 W/ 250 OVERRIDE (IP): Performed by: HOSPITALIST

## 2018-07-29 PROCEDURE — A9270 NON-COVERED ITEM OR SERVICE: HCPCS | Performed by: ORAL & MAXILLOFACIAL SURGERY

## 2018-07-29 PROCEDURE — 99217 PR OBSERVATION CARE DISCHARGE: CPT | Performed by: HOSPITALIST

## 2018-07-29 PROCEDURE — G0378 HOSPITAL OBSERVATION PER HR: HCPCS

## 2018-07-29 PROCEDURE — 700102 HCHG RX REV CODE 250 W/ 637 OVERRIDE(OP): Performed by: FAMILY MEDICINE

## 2018-07-29 PROCEDURE — 96376 TX/PRO/DX INJ SAME DRUG ADON: CPT

## 2018-07-29 PROCEDURE — 700105 HCHG RX REV CODE 258: Performed by: HOSPITALIST

## 2018-07-29 RX ORDER — IBUPROFEN 600 MG/1
600 TABLET ORAL EVERY 6 HOURS PRN
Status: DISCONTINUED | OUTPATIENT
Start: 2018-07-29 | End: 2018-07-29 | Stop reason: HOSPADM

## 2018-07-29 RX ORDER — IBUPROFEN 600 MG/1
600 TABLET ORAL EVERY 6 HOURS PRN
Qty: 20 TAB | Refills: 0
Start: 2018-07-29 | End: 2018-08-03

## 2018-07-29 RX ORDER — OXYCODONE HYDROCHLORIDE 5 MG/1
5 TABLET ORAL EVERY 6 HOURS PRN
Qty: 8 TAB | Refills: 0 | Status: SHIPPED | OUTPATIENT
Start: 2018-07-29 | End: 2018-07-31

## 2018-07-29 RX ORDER — AMOXICILLIN AND CLAVULANATE POTASSIUM 875; 125 MG/1; MG/1
1 TABLET, FILM COATED ORAL 2 TIMES DAILY
Qty: 10 TAB | Refills: 0 | Status: SHIPPED | OUTPATIENT
Start: 2018-07-29 | End: 2018-08-03

## 2018-07-29 RX ADMIN — AMPICILLIN SODIUM AND SULBACTAM SODIUM 3 G: 2; 1 INJECTION, POWDER, FOR SOLUTION INTRAMUSCULAR; INTRAVENOUS at 07:17

## 2018-07-29 RX ADMIN — MORPHINE SULFATE 4 MG: 4 INJECTION INTRAVENOUS at 09:23

## 2018-07-29 RX ADMIN — ACETAMINOPHEN 650 MG: 325 TABLET, FILM COATED ORAL at 07:16

## 2018-07-29 RX ADMIN — KETOROLAC TROMETHAMINE 30 MG: 30 INJECTION, SOLUTION INTRAMUSCULAR at 05:03

## 2018-07-29 RX ADMIN — SODIUM CHLORIDE: 9 INJECTION, SOLUTION INTRAVENOUS at 02:51

## 2018-07-29 RX ADMIN — IBUPROFEN 600 MG: 600 TABLET ORAL at 11:31

## 2018-07-29 RX ADMIN — ONDANSETRON HYDROCHLORIDE 4 MG: 2 INJECTION, SOLUTION INTRAMUSCULAR; INTRAVENOUS at 10:07

## 2018-07-29 RX ADMIN — AMPICILLIN SODIUM AND SULBACTAM SODIUM 3 G: 2; 1 INJECTION, POWDER, FOR SOLUTION INTRAMUSCULAR; INTRAVENOUS at 02:48

## 2018-07-29 ASSESSMENT — PATIENT HEALTH QUESTIONNAIRE - PHQ9
1. LITTLE INTEREST OR PLEASURE IN DOING THINGS: NOT AT ALL
SUM OF ALL RESPONSES TO PHQ9 QUESTIONS 1 AND 2: 0

## 2018-07-29 ASSESSMENT — PAIN SCALES - GENERAL
PAINLEVEL_OUTOF10: 3
PAINLEVEL_OUTOF10: 5
PAINLEVEL_OUTOF10: 4

## 2018-07-29 NOTE — OR NURSING
1720- To PACU from OR, Report Received, Pt sleeping, respirations spontaneous and non-labored via OPA.  Gauze sticking out of mouth on upper left side.  No active bleeding present.   1735- Resting quietly, VSS,  Dr. Day in with Pt.    1750- VSS, medicated for increase pain.  Sips of water given, denies nausea. Con't to medicate.      1805- VSS, resting, sadia ice chips, ice pack to left side of face.    1811- Report to Jayla MERCHANT

## 2018-07-29 NOTE — PROGRESS NOTES
Oral Surgery Progress    POD #1  Operation:  Incision and drainage left upper buccal abscess, sinus evacuation, and extraction of tooth number 15.  Complaints:  Soreness.  Feeling better.   Issues overnight:  None  Pt is ambulating, voiding, and tolerating PO.  Pain is controlled with oral pain meds.    Exam:  AF, VSSN  Pt appears well in no distress.  Surgical sites clean.  Swelling improved.    Labs:  None new    A/P:  Pt doing well POD #1 s/p extraction and I&D abscess and meets criteria for discharge.      Discharge home with oral care supplies.  Warm compresses to swollen areas 2-3 times daily for 5 days.  Diet soft.  Salt water rinses 3-4 times daily.  Brush teeth BID carefully around sutured sites.    Follow-up:  1 week in office.      Prescription:  1.  Oral pain meds  2.  Antibiotics 5 days    Thanks for your assistance.    Peter Day DDS, MD  [phone]

## 2018-07-29 NOTE — PROGRESS NOTES
Pt back to floor via PACU RN on 3L o2 sating at 98%. Pain is 7/10 left side of face, will medicate per MAR.

## 2018-07-29 NOTE — OP REPORT
DATE OF SERVICE:  07/28/2018    PREOPERATIVE DIAGNOSES:  1.  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  2.  Left upper buccal vestibular abscess with surrounding cellulitis.  3.  Acute left maxillary sinusitis secondary to dental infection.    POSTOPERATIVE DIAGNOSES:  1.  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  2.  Left upper buccal vestibular abscess with surrounding cellulitis.  3.  Acute left maxillary sinusitis secondary to dental infection.    PROCEDURES:  1.  Extraction of tooth #15.  2.  Intraoral incision and drainage of the left maxillary vestibular abscess   and 3 Vasquez-Michael left maxillary sinus evacuation and irrigation.    SURGEON:  Peter Day MD, DDS    ANESTHESIA:  General anesthesia with oral endotracheal intubation.    ANESTHESIOLOGIST:  Gary Walden MD    INDICATION:  This patient is a 48-year-old woman with a 4-day history of   increasing pain and swelling of the left mid facial region.  She presented to   [facility] early this morning with   complaints of fever, increasing swelling, and pain.  She has pain in the upper   left molar region preceded the swelling.  She has encountered previous   infections associated with the upper left second molar.  She was admitted to   the hospitalist service and the antibiotics were started.  I saw called for   consultation.  I saw her on the floor.  After physical and radiographic   examination, I explained the problem or the cause of her infection, which was   clearly the left upper second molar, which showed apical radiolucencies on CT   and hazy opacification of the left maxillary sinus consistent with chronic   sinusitis due to dental disease.  Examination revealed a tender fluctuance of   the left maxillary buccal vestibule, slight mobility of tooth #15 with acute   tenderness.  Extraction of tooth #15 was recommended with incision and   drainage of the abscess and left maxillary sinus evacuation and irrigation.     The patient was amenable to this treatment.  IV antibiotics should be   continued for 24-48 hours post-surgery followed by a course of oral   antibiotics on discharged.  The patient understood this treatment, rationale,   and formal consent was obtained to proceed with surgery under general   anesthesia today.    PROCEDURE DESCRIPTION:  Patient was taken to the operating room at [facility] on the afternoon of 07/28/2018.  She was   placed in supine position and general anesthesia with oral endotracheal   intubation was performed without complication by Dr. Walden.  Patient was   positioned, prepped and draped in the usual fashion for an intraoral incision   and drainage and dental extraction procedure.  IV antibiotics had been   administered preoperatively.    Approximately 6 mL of 1% lidocaine with 1:100,000 dilution of epinephrine was   administered via left maxillary regional blocks and local infiltration.  A   gingival sulcular incision was made with a mesial buccal release at tooth #14.    A full thickness mucoperiosteal flap was elevated buccally and superiorly   releasing approximately 1 mL of pus.  The abscess cavity was explored bluntly.    Tooth #15 was evaluated and noted to have class III mobility.  The tooth was   gently luxated with forceps and an elevator and removed.  A thin portion of   the buccal alveolar bone was removed with the tooth and the margins were   .  No adjacent tooth root exposure was encountered.    A 4 mm circular opening was made in the left lateral sinus wall with a   handpiece and bur.  It was enlarged with a rongeur.  Curettes entered the   sinus cavity and pus was released.  Mucus blebs were disrupted in the sinus   and suctioned.  The sinus was flushed copiously with saline and exudate and   debris was aspirated through the nose and the mouth.  Irrigation continued   until the sinus was completely clear.  The sinus was suctioned.   The   extraction socket was curetted thoroughly and irrigated with saline.  The   buccal soft tissues were replaced and sutured with multiple interrupted 3-0   simple and figure-of-eight sutures of 3-0 chromic gut.    The throat pack was removed and the oropharynx was suctioned.  The patient was   undraped and cleansed.  Gauze was placed over the surgical site for   hemostasis.  The patient was turned over to anesthesia for emergence and   extubation.  This was performed without complication by Dr. Walden.  The   patient was transferred to the PACU, awakened in stable condition.    ESTIMATED BLOOD LOSS:  30 mL.    SPECIMENS:  None (teeth discarded).    DRAINS:  None.    COMPLICATIONS:  Loss of a fragment of the buccal alveolar bone with removal of   tooth #15.    DISPOSITION:  After recovery in the PACU, the patient will be admitted to the   floor for continued IV antibiotics, observation, and pain control.  I   anticipate a 24-48 hour stay followed by discharge and a short course of oral   antibiotics.  I will see the patient on followup in approximately 1 week in my   office.       ____________________________________     MASON SUERO MD,DDS    ZAIDA / ANEUDY    DD:  07/28/2018 17:38:04  DT:  07/28/2018 18:04:30    D#:  [id number]  Job#:  [id number]

## 2018-07-29 NOTE — CARE PLAN
Problem: Safety  Goal: Will remain free from falls  Outcome: PROGRESSING AS EXPECTED  Pt has steady gait and calls for assistance if necessary.

## 2018-07-29 NOTE — PROGRESS NOTES
Pt given morphine for pain, became nauseated, will ask for pain pills during rounds. Pt now feels better. Instructed to saline rinse and given tooth brush and hot pack.

## 2018-07-29 NOTE — OR SURGEON
Immediate Post OP Note    PreOp Diagnosis:   1.  Chronic apical infection tooth number 15.  2.  Left upper buccal vestibular abscess with surrounding cellulitis.  3.  Acute left maxillary sinusitis secondary to dental infection.    PostOp Diagnosis:   1.  Chronic apical infection tooth number 15.  2.  Left upper buccal vestibular abscess with surrounding cellulitis.  3.  Acute left maxillary sinusitis secondary to dental infection.    Procedure(s):  1.  Extraction of tooth number 15  2.  Intraoral incision and drainage of the left maxillary vestibular abscess.  3.  Vasquez-james left maxillary sinus evacuation.    - Wound Class: Dirty or Infected    Surgeon(s):  Peter Day M.D., D.D.S.    Anesthesiologist/Type of Anesthesia:  Anesthesiologist: Gary Walden M.D.  Anesthesia Technician: Shaina Ruelas/General    Surgical Staff:  Circulator: Latasha Tavarez R.N.  Scrub Person: Cornelia Robles    Specimens removed if any:  None (tooth discarded)    Estimated Blood Loss: 30 ml    Findings: Pus in the left upper buccal vestibule and in the left maxillary sinus.  Tooth number 15 with with apical granulomas.    Complications: Loss of buccal bone during extraction of tooth number 15.        7/28/2018 5:19 PM Peter Day M.D.

## 2018-07-29 NOTE — DISCHARGE INSTRUCTIONS
Discharge Instructions    Diet:  Soft and advance as tolerated   Activity:  As tolerated   Meds:  Augmentin 5 days, motrin prn   F/U with Dr. Day as planned   Return to ER if any increased pain/swelling, fever, increased drainage    Discharge home with oral care supplies.  Warm compresses to swollen areas 2-3 times daily for 5 days.  Diet soft.  Salt water rinses 3-4 times daily.  Brush teeth BID carefully around sutured sites.     Follow-up:  1 week in office.       Prescription:  1.  Oral pain meds  2.  Antibiotics 5 days    Discharged to home by car with relative. Discharged via walking, hospital escort: Yes.  Special equipment needed: Not Applicable    Be sure to schedule a follow-up appointment with your primary care doctor or any specialists as instructed.     Discharge Plan:   Influenza Vaccine Indication: Patient Refuses, Not indicated: Previously immunized this influenza season and > 8 years of age    I understand that a diet low in cholesterol, fat, and sodium is recommended for good health. Unless I have been given specific instructions below for another diet, I accept this instruction as my diet prescription.   Other diet: Soft and advance as tolerated    Special Instructions: None    · Is patient discharged on Warfarin / Coumadin?   No     Depression / Suicide Risk    [facility], it is important to learn how to keep safe from harming yourself.    Recognize the warning signs:  · Abrupt changes in personality, positive or negative- including increase in energy   · Giving away possessions  · Change in eating patterns- significant weight changes-  positive or negative  · Change in sleeping patterns- unable to sleep or sleeping all the time   · Unwillingness or inability to communicate  · Depression  · Unusual sadness, discouragement and loneliness  · Talk of wanting to die  · Neglect of personal appearance   · Rebelliousness- reckless behavior  · Withdrawal from  people/activities they love  · Confusion- inability to concentrate     If you or a loved one observes any of these behaviors or has concerns about self-harm, here's what you can do:  · Talk about it- your feelings and reasons for harming yourself  · Remove any means that you might use to hurt yourself (examples: pills, rope, extension cords, firearm)  · Get professional help from the community (Mental Health, Substance Abuse, psychological counseling)  · Do not be alone:Call your Safe Contact- someone whom you trust who will be there for you.  · Call your local CRISIS HOTLINE 103-3387 or 840-237-1658  · Call your local Children's Mobile Crisis Response Team [place] (444) 674-9193 or www.[employer]  · Call the toll free National Suicide Prevention Hotlines   · National Suicide Prevention Lifeline 631-474-MMEZ (4667)  · National Hope Line Network 800-SUICIDE (268-5042)

## 2018-07-29 NOTE — PROGRESS NOTES
Pt discharge teaching completed and prescriptions given. IV removed. Pt given oral care supplies. Pt family at bedside and teaching reviewed. Pt will eat lunch then call for a wheelchair when she is ready to discharge home.

## 2018-07-29 NOTE — PROGRESS NOTES
Assumed care of pt, POC reviewed, VSS, pt sitting up in bed visiting with family,reviewed saline rinses and heat pack Q4 hrs.

## 2018-07-30 LAB
BACTERIA UR CULT: NORMAL
SIGNIFICANT IND 70042: NORMAL
SITE SITE: NORMAL
SOURCE SOURCE: NORMAL

## 2018-07-30 NOTE — DISCHARGE SUMMARY
Discharge Summary    CHIEF COMPLAINT ON ADMISSION  Chief Complaint   Patient presents with   • Facial Swelling       Reason for Admission  Facial Swelling      Admission Date  7/27/2018    CODE STATUS  Prior    HPI & HOSPITAL COURSE  This is a 48 year old female who presented to the ER because of a fever and left sided facial pain with swelling. About 3 days prior, she noticed increasing pain in her upper left tooth. She had cracked this tooth in the past but never followed up with a dentist. She had pain in that tooth when she attempted to eat. Over the past 3 days, the left side of her face had become more swollen and tender and she also had pain in her left sinus area.  In the ER, she had a CT maxillofacial bones which reveals let maxillary sinusitis due to erosion of the left maxillary tooth into the sinus along with left facial cellulitis. WBC was normal but her temp on arrival [id number].  She was placed on IV Unasyn and seen by Dr. Day, who took her to surgery later that day, removing tooth number 15.  By the next day, her swelling was remarkably down, and she was able to eat.  Her pain was controlled with pain meds.  She had also remained afebrile.        Therefore, she is discharged in good and stable condition to home with close outpatient follow-up.    Discharge Date  7/29/2018    FOLLOW UP ITEMS POST DISCHARGE  Return to ER if any increased pain/swelling, fever, increased drainage    DISCHARGE DIAGNOSES  Active Problems:    Pain, dental POA: Yes    Hyponatremia POA: Yes  Resolved Problems:    Apical abscess POA: Yes    Facial cellulitis POA: Yes      FOLLOW UP  No future appointments.  Cortez Dove M.D.  [address]  [place] [address]  [phone]      Call Monday morning to schedule an appointment for hospital follow-up.    Peter Day M.D.  [address]  [place] [address]  [phone]      Call Monday morning to schedule a follow-up appointment for 1 week post  surgery.      MEDICATIONS ON DISCHARGE  I reviewed this patient's controlled substance use history     Medication List      START taking these medications      Instructions   amoxicillin-clavulanate 875-125 MG Tabs  Commonly known as:  AUGMENTIN   Take 1 Tab by mouth 2 times a day for 5 days.  Dose:  1 Tab     ibuprofen 600 MG Tabs  Commonly known as:  MOTRIN   Take 1 Tab by mouth every 6 hours as needed for Mild Pain or Moderate Pain for up to 5 days.  Dose:  600 mg     oxyCODONE immediate-release 5 MG Tabs  Commonly known as:  ROXICODONE   Take 1 Tab by mouth every 6 hours as needed for Severe Pain for up to 2 days.  Dose:  5 mg            Allergies  Allergies   Allergen Reactions   • Nkda [No Known Drug Allergy]        DIET  Soft, advance as tolerated    ACTIVITY  As tolerated.  Weight bearing as tolerated    CONSULTATIONS  Oral surgery Dr. Day    PROCEDURES  Incision and drainage left upper buccal abscess, sinus evacuation, and extraction of tooth number 15 on 7/28 by Dr. Day.    LABORATORY  Lab Results   Component Value Date    SODIUM 131 (L) (s/p NS fluids) 07/27/2018    POTASSIUM 3.8 07/27/2018    CHLORIDE 104 07/27/2018    CO2 20 07/27/2018    GLUCOSE 120 (H) 07/27/2018    BUN 9 07/27/2018    CREATININE 0.80 07/27/2018    CREATININE 0.9 11/04/2008        Lab Results   Component Value Date    WBC 9.9 07/27/2018    HEMOGLOBIN 13.0 07/27/2018    HEMATOCRIT 39.7 07/27/2018    PLATELETCT 222 07/27/2018

## 2018-08-01 LAB
BACTERIA BLD CULT: NORMAL
SIGNIFICANT IND 70042: NORMAL
SITE SITE: NORMAL
SOURCE SOURCE: NORMAL

## 2018-08-02 LAB
BACTERIA BLD CULT: NORMAL
SIGNIFICANT IND 70042: NORMAL
SITE SITE: NORMAL
SOURCE SOURCE: NORMAL

## 2024-02-23 ENCOUNTER — HOSPITAL ENCOUNTER (OUTPATIENT)
Dept: RADIOLOGY | Facility: MEDICAL CENTER | Age: 54
End: 2024-02-23
Attending: NURSE PRACTITIONER
Payer: COMMERCIAL

## 2024-02-23 DIAGNOSIS — M25.511 ACUTE PAIN OF RIGHT SHOULDER: ICD-10-CM

## 2024-02-23 DIAGNOSIS — M54.2 CERVICALGIA: ICD-10-CM

## 2024-02-23 PROCEDURE — 73030 X-RAY EXAM OF SHOULDER: CPT | Mod: RT

## 2024-02-23 PROCEDURE — 72050 X-RAY EXAM NECK SPINE 4/5VWS: CPT

## 2024-02-26 ENCOUNTER — APPOINTMENT (OUTPATIENT)
Dept: RADIOLOGY | Facility: MEDICAL CENTER | Age: 54
End: 2024-02-26
Attending: NURSE PRACTITIONER
Payer: COMMERCIAL

## 2024-03-11 ENCOUNTER — HOSPITAL ENCOUNTER (OUTPATIENT)
Dept: RADIOLOGY | Facility: MEDICAL CENTER | Age: 54
End: 2024-03-11
Attending: NURSE PRACTITIONER
Payer: COMMERCIAL

## 2024-03-11 DIAGNOSIS — F17.211 CIGARETTE NICOTINE DEPENDENCE IN REMISSION: ICD-10-CM

## 2024-03-11 PROCEDURE — 76706 US ABDL AORTA SCREEN AAA: CPT

## 2024-03-25 ENCOUNTER — HOSPITAL ENCOUNTER (OUTPATIENT)
Dept: LAB | Facility: MEDICAL CENTER | Age: 54
End: 2024-03-25
Attending: NURSE PRACTITIONER
Payer: COMMERCIAL

## 2024-03-25 LAB
25(OH)D3 SERPL-MCNC: 40 NG/ML (ref 30–100)
ALBUMIN SERPL BCP-MCNC: 4.5 G/DL (ref 3.2–4.9)
ALBUMIN/GLOB SERPL: 1.5 G/DL
ALP SERPL-CCNC: 86 U/L (ref 30–99)
ALT SERPL-CCNC: 19 U/L (ref 2–50)
ANION GAP SERPL CALC-SCNC: 14 MMOL/L (ref 7–16)
APPEARANCE UR: CLEAR
AST SERPL-CCNC: 18 U/L (ref 12–45)
BASOPHILS # BLD AUTO: 0.7 % (ref 0–1.8)
BASOPHILS # BLD: 0.08 K/UL (ref 0–0.12)
BILIRUB SERPL-MCNC: 0.2 MG/DL (ref 0.1–1.5)
BILIRUB UR QL STRIP.AUTO: NEGATIVE
BUN SERPL-MCNC: 9 MG/DL (ref 8–22)
CALCIUM ALBUM COR SERPL-MCNC: 9.4 MG/DL (ref 8.5–10.5)
CALCIUM SERPL-MCNC: 9.8 MG/DL (ref 8.5–10.5)
CHLORIDE SERPL-SCNC: 103 MMOL/L (ref 96–112)
CHOLEST SERPL-MCNC: 183 MG/DL (ref 100–199)
CO2 SERPL-SCNC: 22 MMOL/L (ref 20–33)
COLOR UR: YELLOW
CREAT SERPL-MCNC: 0.66 MG/DL (ref 0.5–1.4)
EOSINOPHIL # BLD AUTO: 1.07 K/UL (ref 0–0.51)
EOSINOPHIL NFR BLD: 9.3 % (ref 0–6.9)
ERYTHROCYTE [DISTWIDTH] IN BLOOD BY AUTOMATED COUNT: 42.3 FL (ref 35.9–50)
EST. AVERAGE GLUCOSE BLD GHB EST-MCNC: 229 MG/DL
FOLATE SERPL-MCNC: 8.1 NG/ML
GFR SERPLBLD CREATININE-BSD FMLA CKD-EPI: 103 ML/MIN/1.73 M 2
GLOBULIN SER CALC-MCNC: 3.1 G/DL (ref 1.9–3.5)
GLUCOSE SERPL-MCNC: 172 MG/DL (ref 65–99)
GLUCOSE UR STRIP.AUTO-MCNC: NEGATIVE MG/DL
HBA1C MFR BLD: 9.6 % (ref 4–5.6)
HCT VFR BLD AUTO: 43.7 % (ref 37–47)
HCV AB SER QL: NORMAL
HDLC SERPL-MCNC: 43 MG/DL
HGB BLD-MCNC: 14 G/DL (ref 12–16)
IMM GRANULOCYTES # BLD AUTO: 0.04 K/UL (ref 0–0.11)
IMM GRANULOCYTES NFR BLD AUTO: 0.3 % (ref 0–0.9)
KETONES UR STRIP.AUTO-MCNC: NEGATIVE MG/DL
LDLC SERPL CALC-MCNC: 93 MG/DL
LEUKOCYTE ESTERASE UR QL STRIP.AUTO: NEGATIVE
LYMPHOCYTES # BLD AUTO: 2.85 K/UL (ref 1–4.8)
LYMPHOCYTES NFR BLD: 24.8 % (ref 22–41)
MCH RBC QN AUTO: 27.5 PG (ref 27–33)
MCHC RBC AUTO-ENTMCNC: 32 G/DL (ref 32.2–35.5)
MCV RBC AUTO: 85.9 FL (ref 81.4–97.8)
MICRO URNS: NORMAL
MONOCYTES # BLD AUTO: 0.71 K/UL (ref 0–0.85)
MONOCYTES NFR BLD AUTO: 6.2 % (ref 0–13.4)
NEUTROPHILS # BLD AUTO: 6.75 K/UL (ref 1.82–7.42)
NEUTROPHILS NFR BLD: 58.7 % (ref 44–72)
NITRITE UR QL STRIP.AUTO: NEGATIVE
NRBC # BLD AUTO: 0 K/UL
NRBC BLD-RTO: 0 /100 WBC (ref 0–0.2)
PH UR STRIP.AUTO: 5.5 [PH] (ref 5–8)
PLATELET # BLD AUTO: 341 K/UL (ref 164–446)
PMV BLD AUTO: 9.6 FL (ref 9–12.9)
POTASSIUM SERPL-SCNC: 3.8 MMOL/L (ref 3.6–5.5)
PROT SERPL-MCNC: 7.6 G/DL (ref 6–8.2)
PROT UR QL STRIP: NEGATIVE MG/DL
RBC # BLD AUTO: 5.09 M/UL (ref 4.2–5.4)
RBC UR QL AUTO: NEGATIVE
SODIUM SERPL-SCNC: 139 MMOL/L (ref 135–145)
SP GR UR STRIP.AUTO: 1.02
TRIGL SERPL-MCNC: 234 MG/DL (ref 0–149)
TSH SERPL DL<=0.005 MIU/L-ACNC: 1.35 UIU/ML (ref 0.38–5.33)
UROBILINOGEN UR STRIP.AUTO-MCNC: 0.2 MG/DL
VIT B12 SERPL-MCNC: 788 PG/ML (ref 211–911)
WBC # BLD AUTO: 11.5 K/UL (ref 4.8–10.8)

## 2024-03-25 PROCEDURE — 84207 ASSAY OF VITAMIN B-6: CPT

## 2024-03-25 PROCEDURE — 80053 COMPREHEN METABOLIC PANEL: CPT

## 2024-03-25 PROCEDURE — 36415 COLL VENOUS BLD VENIPUNCTURE: CPT

## 2024-03-25 PROCEDURE — 85025 COMPLETE CBC W/AUTO DIFF WBC: CPT

## 2024-03-25 PROCEDURE — 84443 ASSAY THYROID STIM HORMONE: CPT

## 2024-03-25 PROCEDURE — 86803 HEPATITIS C AB TEST: CPT

## 2024-03-25 PROCEDURE — 81003 URINALYSIS AUTO W/O SCOPE: CPT

## 2024-03-25 PROCEDURE — 80061 LIPID PANEL: CPT

## 2024-03-25 PROCEDURE — 83036 HEMOGLOBIN GLYCOSYLATED A1C: CPT

## 2024-03-25 PROCEDURE — 82746 ASSAY OF FOLIC ACID SERUM: CPT

## 2024-03-25 PROCEDURE — 82607 VITAMIN B-12: CPT

## 2024-03-25 PROCEDURE — 82306 VITAMIN D 25 HYDROXY: CPT

## 2024-03-29 LAB — VIT B6 SERPL-MCNC: 170.3 NMOL/L (ref 20–125)

## 2024-04-04 ENCOUNTER — TELEPHONE (OUTPATIENT)
Dept: HEALTH INFORMATION MANAGEMENT | Facility: OTHER | Age: 54
End: 2024-04-04
Payer: COMMERCIAL

## 2024-04-11 ENCOUNTER — HOSPITAL ENCOUNTER (OUTPATIENT)
Dept: RADIOLOGY | Facility: MEDICAL CENTER | Age: 54
End: 2024-04-11
Attending: NURSE PRACTITIONER
Payer: COMMERCIAL

## 2024-04-11 DIAGNOSIS — F17.211 CIGARETTE NICOTINE DEPENDENCE IN REMISSION: ICD-10-CM

## 2024-04-11 PROCEDURE — 71271 CT THORAX LUNG CANCER SCR C-: CPT

## 2024-05-20 ENCOUNTER — APPOINTMENT (OUTPATIENT)
Dept: RADIOLOGY | Facility: MEDICAL CENTER | Age: 54
End: 2024-05-20
Attending: NURSE PRACTITIONER
Payer: COMMERCIAL

## 2025-03-07 ENCOUNTER — TELEPHONE (OUTPATIENT)
Dept: HEALTH INFORMATION MANAGEMENT | Facility: OTHER | Age: 55
End: 2025-03-07
Payer: COMMERCIAL

## 2025-03-28 ENCOUNTER — APPOINTMENT (OUTPATIENT)
Dept: RADIOLOGY | Facility: MEDICAL CENTER | Age: 55
End: 2025-03-28
Attending: NURSE PRACTITIONER
Payer: COMMERCIAL

## 2025-04-07 ENCOUNTER — HOSPITAL ENCOUNTER (OUTPATIENT)
Dept: RADIOLOGY | Facility: MEDICAL CENTER | Age: 55
End: 2025-04-07
Attending: NURSE PRACTITIONER
Payer: COMMERCIAL

## 2025-04-07 DIAGNOSIS — F17.211 CIGARETTE NICOTINE DEPENDENCE IN REMISSION: ICD-10-CM

## 2025-04-07 PROCEDURE — 71271 CT THORAX LUNG CANCER SCR C-: CPT

## 2025-07-31 ENCOUNTER — APPOINTMENT (OUTPATIENT)
Dept: URBAN - METROPOLITAN AREA CLINIC 4 | Facility: CLINIC | Age: 55
Setting detail: DERMATOLOGY
End: 2025-07-31

## 2025-07-31 DIAGNOSIS — L72.8 OTHER FOLLICULAR CYSTS OF THE SKIN AND SUBCUTANEOUS TISSUE: ICD-10-CM

## 2025-07-31 DIAGNOSIS — L82.1 OTHER SEBORRHEIC KERATOSIS: ICD-10-CM

## 2025-07-31 DIAGNOSIS — L73.2 HIDRADENITIS SUPPURATIVA: ICD-10-CM | Status: INADEQUATELY CONTROLLED

## 2025-07-31 PROCEDURE — ? PHOTO-DOCUMENTATION

## 2025-07-31 PROCEDURE — ? PRESCRIPTION

## 2025-07-31 PROCEDURE — ? CONSULTATION EXCISION

## 2025-07-31 PROCEDURE — ? ADDITIONAL NOTES

## 2025-07-31 PROCEDURE — ? MEDICATION COUNSELING

## 2025-07-31 PROCEDURE — ? COUNSELING

## 2025-07-31 RX ORDER — DOXYCYCLINE HYCLATE 100 MG/1
CAPSULE, GELATIN COATED ORAL
Qty: 60 | Refills: 2 | Status: ERX | COMMUNITY
Start: 2025-07-31

## 2025-07-31 RX ORDER — METRONIDAZOLE 7.5 MG/G
CREAM TOPICAL
Qty: 45 | Refills: 3 | Status: ERX | COMMUNITY
Start: 2025-07-31

## 2025-07-31 RX ORDER — CLINDAMYCIN PHOSPHATE 10 MG/G
GEL TOPICAL
Qty: 60 | Refills: 2 | Status: ERX | COMMUNITY
Start: 2025-07-31

## 2025-07-31 RX ORDER — MOMETASONE FUROATE 1 MG/G
CREAM TOPICAL
Qty: 45 | Refills: 2 | Status: ERX | COMMUNITY
Start: 2025-07-31

## 2025-07-31 RX ADMIN — CLINDAMYCIN PHOSPHATE: 10 GEL TOPICAL at 00:00

## 2025-07-31 RX ADMIN — MOMETASONE FUROATE: 1 CREAM TOPICAL at 00:00

## 2025-07-31 RX ADMIN — METRONIDAZOLE: 7.5 CREAM TOPICAL at 00:00

## 2025-07-31 RX ADMIN — DOXYCYCLINE HYCLATE: 100 CAPSULE, GELATIN COATED ORAL at 00:00

## 2025-07-31 ASSESSMENT — LOCATION DETAILED DESCRIPTION DERM
LOCATION DETAILED: LEFT AXILLARY VAULT
LOCATION DETAILED: LEFT INFERIOR UPPER BACK
LOCATION DETAILED: RIGHT INGUINAL CREASE
LOCATION DETAILED: LEFT LATERAL TRAPEZIAL NECK
LOCATION DETAILED: RIGHT AXILLARY VAULT
LOCATION DETAILED: LEFT ANTERIOR PROXIMAL THIGH
LOCATION DETAILED: RIGHT INFERIOR LATERAL UPPER BACK

## 2025-07-31 ASSESSMENT — LOCATION SIMPLE DESCRIPTION DERM
LOCATION SIMPLE: LEFT UPPER BACK
LOCATION SIMPLE: LEFT AXILLARY VAULT
LOCATION SIMPLE: RIGHT AXILLARY VAULT
LOCATION SIMPLE: GROIN
LOCATION SIMPLE: RIGHT UPPER BACK
LOCATION SIMPLE: LEFT THIGH
LOCATION SIMPLE: POSTERIOR NECK

## 2025-07-31 ASSESSMENT — LOCATION ZONE DERM
LOCATION ZONE: TRUNK
LOCATION ZONE: NECK
LOCATION ZONE: LEG
LOCATION ZONE: AXILLAE

## (undated) DEVICE — TUBE E-T HI-LO CUFF 7.0MM (10EA/PK)

## (undated) DEVICE — NEEDLE NON SAFETY HYPO 22 GA X 1 1/2 IN (100/BX)

## (undated) DEVICE — PACK MINOR BASIN - (2EA/CA)

## (undated) DEVICE — SUTURE GENERAL

## (undated) DEVICE — KIT ROOM DECONTAMINATION

## (undated) DEVICE — DRAIN PENROSE STERILE 1/4 X - 18 IN  (25EA/BX)

## (undated) DEVICE — BUR 702 1.6 (ORAL)

## (undated) DEVICE — GLOVE, LITE (PAIR)

## (undated) DEVICE — GLOVE BIOGEL SZ 7 SURGICAL PF LTX - (50PR/BX 4BX/CA)

## (undated) DEVICE — SODIUM CHL. IRRIGATION 0.9% 3000ML (4EA/CA 65CA/PF)

## (undated) DEVICE — DRAPE SURGICAL U 77X120 - (10/CA)

## (undated) DEVICE — SPONGE GAUZESTER 4 X 4 4PLY - (128PK/CA)

## (undated) DEVICE — PROTECTOR ULNA NERVE - (36PR/CA)

## (undated) DEVICE — SENSOR SPO2 NEO LNCS ADHESIVE (20/BX) SEE USER NOTES

## (undated) DEVICE — SYRINGE 10 ML CONTROL LL (25EA/BX 4BX/CA)

## (undated) DEVICE — ELECTRODE DUAL RETURN W/ CORD - (50/PK)

## (undated) DEVICE — KIT ANESTHESIA W/CIRCUIT & 3/LT BAG W/FILTER (20EA/CA)

## (undated) DEVICE — BLADE SURGICAL #15 - (50/BX 3BX/CA)

## (undated) DEVICE — SODIUM CHL IRRIGATION 0.9% 1000ML (12EA/CA)

## (undated) DEVICE — GLOVE BIOGEL SZ 8 SURGICAL PF LTX - (50PR/BX 4BX/CA)

## (undated) DEVICE — HEAD HOLDER JUNIOR/ADULT

## (undated) DEVICE — GLOVE BIOGEL SZ 7.5 SURGICAL PF LTX - (50PR/BX 4BX/CA)

## (undated) DEVICE — GLOVE BIOGEL PI ULTRATOUCH SZ 7.0 SURGICAL PF LF- POWDER FREE (50/BX 4BX/CA)

## (undated) DEVICE — NEPTUNE 4 PORT MANIFOLD - (20/PK)

## (undated) DEVICE — ELECTRODE 850 FOAM ADHESIVE - HYDROGEL RADIOTRNSPRNT (50/PK)

## (undated) DEVICE — SUTURE 3-0 CHROMIC GUT SH 27 (36PK/BX)"